# Patient Record
Sex: MALE | Race: OTHER
[De-identification: names, ages, dates, MRNs, and addresses within clinical notes are randomized per-mention and may not be internally consistent; named-entity substitution may affect disease eponyms.]

---

## 2023-01-10 ENCOUNTER — HOSPITAL ENCOUNTER (EMERGENCY)
Dept: HOSPITAL 76 - ED | Age: 28
Discharge: HOME | End: 2023-01-10
Payer: COMMERCIAL

## 2023-01-10 VITALS — DIASTOLIC BLOOD PRESSURE: 89 MMHG | SYSTOLIC BLOOD PRESSURE: 155 MMHG

## 2023-01-10 DIAGNOSIS — S93.491A: ICD-10-CM

## 2023-01-10 DIAGNOSIS — S93.509A: Primary | ICD-10-CM
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
## 2023-01-10 PROCEDURE — 99283 EMERGENCY DEPT VISIT LOW MDM: CPT

## 2023-01-10 PROCEDURE — 99282 EMERGENCY DEPT VISIT SF MDM: CPT

## 2023-01-10 NOTE — XRAY REPORT
PROCEDURE:  Foot 3 View RT

 

INDICATIONS:  Trauma

 

TECHNIQUE:  3 views of the foot were acquired.  

 

COMPARISON:  X-ray ankle 1/10/2023

 

FINDINGS:  

 

Bones:  No fractures or dislocations.  No suspicious bony lesions.  

 

Soft tissues:  No tibiotalar joint effusion.  Achilles tendon appears normal.  

 

IMPRESSION:  

No visualized acute fracture or dislocation. However, occult injury cannot be excluded. Recommend oumar
rt interval imaging follow-up in 7-10 days as clinically indicated for additional evaluation.

 

Reviewed by: Sadie Armando MD on 1/10/2023 8:57 AM PST

Approved by: Sadie Armando MD on 1/10/2023 8:57 AM PST

 

 

Station ID:  [id number]

## 2023-01-10 NOTE — ED PHYSICIAN DOCUMENTATION
PD HPI LOWER EXT INJURY





- Stated complaint


Stated Complaint: RT FT INJ





- Chief complaint


Chief Complaint: Ext Problem





- History obtained from


History obtained from: Patient





- History of Present Illness


PD HPI LOW EXT INJURY LOCATION: Right, Foot, Toe


Type of injury: Twist (he had stubbed middle toe and it continues to hurt with 

pressure of walking. Then had inversion of ankle when stood up and continues 

with pain lateral ankle. Not improved after 2 weeks and here for eval.)


Where injury occurred: Home


Timing - onset: How many weeks ago (2)


Timing - duration: Weeks (2)


Timing - details: Abrupt onset, Still present, Waxing and waning


Worsened by: Moving, Other (walking)


Associated symptoms: No: Weakness, Numbness


Contributing factors: No: Prior ortho surgery





Review of Systems


Neurologic: denies: Focal weakness, Numbness





PD PAST MEDICAL HISTORY





- Past Medical History


Musculoskeletal: None





- Allergies


Allergies/Adverse Reactions: 


                                    Allergies











Allergy/AdvReac Type Severity Reaction Status Date / Time


 


No Known Drug Allergies Allergy   Verified 01/10/23 08:14














PD ED PE NORMAL





- Vitals


Vital signs reviewed: Yes





- General


General: Alert and oriented X 3, Well developed/nourished





- Derm


Derm: Normal color, Warm and dry





- Extremities


Extremities: Other (right ankle tender anterolateral with pain on inversion but 

no laxity. No effusion. Middle toe with some tender at MTP but no deformity. )





- Neuro


Neuro: Alert and oriented X 3, No motor deficit, No sensory deficit





Results





- Vitals


Vitals: 


                               Vital Signs - 24 hr











  01/10/23 01/10/23





  08:15 09:55


 


Temperature 37.0 C 37 C


 


Heart Rate 78 72


 


Respiratory 18 19





Rate  


 


Blood Pressure 180/96 H 155/89 H


 


O2 Saturation 99 99








                                     Oxygen











O2 Source                      Room air

















- Rads (name of study)


  ** right ankle and foot


Radiology: Prelim report reviewed, EMP read indepedently (no fractures), See rad

report





PD Medical Decision Making





- ED course


Complexity details: reviewed results (no fractures), considered differential 

(seems likely sprain but continues with pain for couple weeks. Can get xray to 

ensure no fractures and then provide ankle brace at least. ), d/w patient





Departure





- Departure


Disposition: 01 Home, Self Care


Clinical Impression: 


Toe sprain


Qualifiers:


 Encounter type: initial encounter Qualified Code(s): S93.509A - Unspecified 

sprain of unspecified toe(s), initial encounter





Ankle sprain


Qualifiers:


 Encounter type: initial encounter Involved ligament of ankle: anterior 

talofibular ligament Laterality: right Qualified Code(s): S93.491A - Sprain of 

other ligament of right ankle, initial encounter





Condition: Stable


Record reviewed to determine appropriate education?: Yes


Instructions:  ED Sprain Ankle W X Ray


Comments: 


No fracture seen on your ankle or foot.  Presume a sprain of your toe in your an

kle.  The toe is best served with a firm soled shoe and just lessened motion.





The ankle can benefit from a ankle brace to keep the twisting from occurring.





Ankle and toe sprains can often take 2 to 3 weeks for improving.





Continue with ibuprofen 2-3 times daily and add Tylenol if needed.


Discharge Date/Time: 01/10/23 09:55

## 2023-01-10 NOTE — XRAY REPORT
PROCEDURE:  Ankle 3 View RT

 

INDICATIONS:  Trauma

 

TECHNIQUE:  3 views of the ankle were acquired.  

 

COMPARISON:  None

 

FINDINGS:  

 

Bones:  No fractures or dislocations.  Ankle mortise is normally aligned.  No suspicious bony lesions
.  

 

Soft tissues: Mild lateral malleolar edema.  Achilles tendon appears normal.  

 

IMPRESSION:  No visualized acute fracture or dislocation. However, occult injury cannot be excluded. 
Recommend short interval imaging follow-up in 7-10 days as clinically indicated for additional evalua
tion.

 

Reviewed by: Sadie Armando MD on 1/10/2023 8:57 AM PST

Approved by: Sadie Armando MD on 1/10/2023 8:57 AM [facility]

 

 

Station ID:  [id number]